# Patient Record
Sex: FEMALE | Race: WHITE | NOT HISPANIC OR LATINO | Employment: UNEMPLOYED | URBAN - METROPOLITAN AREA
[De-identification: names, ages, dates, MRNs, and addresses within clinical notes are randomized per-mention and may not be internally consistent; named-entity substitution may affect disease eponyms.]

---

## 2017-02-08 ENCOUNTER — CONVERSION ENCOUNTER (OUTPATIENT)
Dept: GENERAL RADIOLOGY | Facility: HOSPITAL | Age: 47
End: 2017-02-08

## 2018-02-26 ENCOUNTER — OFFICE VISIT CONVERTED (OUTPATIENT)
Dept: GASTROENTEROLOGY | Facility: CLINIC | Age: 48
End: 2018-02-26
Attending: INTERNAL MEDICINE

## 2018-03-13 ENCOUNTER — OFFICE VISIT CONVERTED (OUTPATIENT)
Dept: CARDIOLOGY | Facility: CLINIC | Age: 48
End: 2018-03-13
Attending: SPECIALIST

## 2018-03-13 ENCOUNTER — CONVERSION ENCOUNTER (OUTPATIENT)
Dept: CARDIOLOGY | Facility: CLINIC | Age: 48
End: 2018-03-13

## 2020-10-21 ENCOUNTER — OUTSIDE FACILITY SERVICE (OUTPATIENT)
Dept: SLEEP MEDICINE | Facility: HOSPITAL | Age: 50
End: 2020-10-21

## 2020-10-21 ENCOUNTER — HOSPITAL ENCOUNTER (OUTPATIENT)
Dept: SLEEP MEDICINE | Facility: HOSPITAL | Age: 50
Discharge: HOME | End: 2020-10-21
Attending: INTERNAL MEDICINE

## 2020-10-21 PROCEDURE — 99244 OFF/OP CNSLTJ NEW/EST MOD 40: CPT | Performed by: INTERNAL MEDICINE

## 2020-11-02 ENCOUNTER — HOSPITAL ENCOUNTER (OUTPATIENT)
Dept: PREADMISSION TESTING | Facility: HOSPITAL | Age: 50
Discharge: HOME | End: 2020-11-02
Attending: INTERNAL MEDICINE

## 2020-11-04 LAB — SARS-COV-2 RNA SPEC QL NAA+PROBE: NOT DETECTED

## 2020-11-06 ENCOUNTER — HOSPITAL ENCOUNTER (OUTPATIENT)
Dept: SLEEP MEDICINE | Facility: HOSPITAL | Age: 50
Discharge: HOME | End: 2020-11-06
Attending: INTERNAL MEDICINE

## 2020-11-11 ENCOUNTER — OUTSIDE FACILITY SERVICE (OUTPATIENT)
Dept: SLEEP MEDICINE | Facility: HOSPITAL | Age: 50
End: 2020-11-11

## 2020-11-11 PROCEDURE — 95811 POLYSOM 6/>YRS CPAP 4/> PARM: CPT | Performed by: INTERNAL MEDICINE

## 2020-12-02 ENCOUNTER — HOSPITAL ENCOUNTER (OUTPATIENT)
Dept: SLEEP MEDICINE | Facility: HOSPITAL | Age: 50
Discharge: HOME | End: 2020-12-02
Attending: INTERNAL MEDICINE

## 2020-12-07 ENCOUNTER — OUTSIDE FACILITY SERVICE (OUTPATIENT)
Dept: SLEEP MEDICINE | Facility: HOSPITAL | Age: 50
End: 2020-12-07

## 2020-12-07 PROCEDURE — 95810 POLYSOM 6/> YRS 4/> PARAM: CPT | Performed by: INTERNAL MEDICINE

## 2021-02-03 ENCOUNTER — HOSPITAL ENCOUNTER (OUTPATIENT)
Dept: SLEEP MEDICINE | Facility: HOSPITAL | Age: 51
Discharge: HOME | End: 2021-02-03
Attending: INTERNAL MEDICINE

## 2021-02-03 ENCOUNTER — OUTSIDE FACILITY SERVICE (OUTPATIENT)
Dept: SLEEP MEDICINE | Facility: HOSPITAL | Age: 51
End: 2021-02-03

## 2021-02-03 PROCEDURE — 99213 OFFICE O/P EST LOW 20 MIN: CPT | Performed by: INTERNAL MEDICINE

## 2021-05-16 VITALS
SYSTOLIC BLOOD PRESSURE: 162 MMHG | HEART RATE: 86 BPM | HEIGHT: 69 IN | WEIGHT: 293 LBS | DIASTOLIC BLOOD PRESSURE: 98 MMHG | BODY MASS INDEX: 43.4 KG/M2

## 2021-05-16 VITALS
DIASTOLIC BLOOD PRESSURE: 85 MMHG | OXYGEN SATURATION: 97 % | BODY MASS INDEX: 43.4 KG/M2 | HEART RATE: 81 BPM | WEIGHT: 293 LBS | SYSTOLIC BLOOD PRESSURE: 145 MMHG | RESPIRATION RATE: 12 BRPM | HEIGHT: 69 IN

## 2021-07-09 ENCOUNTER — TRANSCRIBE ORDERS (OUTPATIENT)
Dept: ADMINISTRATIVE | Facility: HOSPITAL | Age: 51
End: 2021-07-09

## 2021-07-09 DIAGNOSIS — Z12.31 SCREENING MAMMOGRAM FOR HIGH-RISK PATIENT: Primary | ICD-10-CM

## 2021-07-14 ENCOUNTER — OFFICE VISIT (OUTPATIENT)
Dept: OTOLARYNGOLOGY | Facility: CLINIC | Age: 51
End: 2021-07-14

## 2021-07-14 VITALS — TEMPERATURE: 96.9 F | HEIGHT: 69 IN | BODY MASS INDEX: 43.4 KG/M2 | WEIGHT: 293 LBS

## 2021-07-14 DIAGNOSIS — J32.9 RECURRENT SINUS INFECTIONS: ICD-10-CM

## 2021-07-14 DIAGNOSIS — H91.90 HEARING LOSS, UNSPECIFIED HEARING LOSS TYPE, UNSPECIFIED LATERALITY: ICD-10-CM

## 2021-07-14 DIAGNOSIS — H92.01 OTALGIA OF RIGHT EAR: ICD-10-CM

## 2021-07-14 DIAGNOSIS — J30.9 ALLERGIC RHINITIS, UNSPECIFIED SEASONALITY, UNSPECIFIED TRIGGER: Primary | ICD-10-CM

## 2021-07-14 PROCEDURE — 99213 OFFICE O/P EST LOW 20 MIN: CPT | Performed by: NURSE PRACTITIONER

## 2021-07-14 RX ORDER — BLOOD-GLUCOSE METER
1 KIT MISCELLANEOUS
COMMUNITY
Start: 2020-07-28 | End: 2021-07-28

## 2021-07-14 RX ORDER — LISINOPRIL 2.5 MG/1
2.5 TABLET ORAL DAILY
COMMUNITY
Start: 2021-06-04 | End: 2022-02-09

## 2021-07-14 RX ORDER — METOPROLOL TARTRATE 100 MG/1
100 TABLET ORAL 3 TIMES DAILY
COMMUNITY
Start: 2021-04-22

## 2021-07-14 RX ORDER — INSULIN HUMAN 500 [IU]/ML
INJECTION, SOLUTION SUBCUTANEOUS
COMMUNITY
Start: 2021-05-11

## 2021-07-14 RX ORDER — AMIODARONE HYDROCHLORIDE 200 MG/1
200 TABLET ORAL 2 TIMES DAILY
COMMUNITY
Start: 2021-04-21 | End: 2022-02-09

## 2021-07-14 RX ORDER — FLURBIPROFEN SODIUM 0.3 MG/ML
SOLUTION/ DROPS OPHTHALMIC
COMMUNITY
Start: 2021-06-16

## 2021-07-14 RX ORDER — LIRAGLUTIDE 6 MG/ML
INJECTION SUBCUTANEOUS
COMMUNITY
Start: 2021-06-15 | End: 2021-08-24

## 2021-07-14 RX ORDER — INSULIN DEGLUDEC INJECTION 100 U/ML
INJECTION, SOLUTION SUBCUTANEOUS
COMMUNITY
End: 2021-08-24

## 2021-07-14 RX ORDER — SPIRONOLACTONE 25 MG/1
TABLET ORAL
COMMUNITY
Start: 2021-07-13 | End: 2022-02-09

## 2021-07-14 RX ORDER — DAPAGLIFLOZIN 10 MG/1
1 TABLET, FILM COATED ORAL DAILY
COMMUNITY
Start: 2021-06-04 | End: 2022-02-09

## 2021-07-14 RX ORDER — FLUTICASONE PROPIONATE 50 MCG
2 SPRAY, SUSPENSION (ML) NASAL DAILY
Qty: 16 G | Refills: 6 | Status: SHIPPED | OUTPATIENT
Start: 2021-07-14 | End: 2022-02-09

## 2021-07-14 RX ORDER — DIGOXIN 250 MCG
250 TABLET ORAL DAILY
COMMUNITY
Start: 2021-06-28 | End: 2022-02-09

## 2021-07-14 RX ORDER — CEFDINIR 300 MG/1
CAPSULE ORAL
COMMUNITY
Start: 2021-06-15 | End: 2021-08-24

## 2021-07-14 RX ORDER — CALCIUM CITRATE/VITAMIN D3 200MG-6.25
TABLET ORAL
COMMUNITY
Start: 2021-02-08

## 2021-07-14 RX ORDER — APIXABAN 5 MG/1
5 TABLET, FILM COATED ORAL 2 TIMES DAILY
COMMUNITY
Start: 2021-04-21

## 2021-07-14 NOTE — PATIENT INSTRUCTIONS
Neilmed Saline Nasal Rinse  http://www.[employer].Wi3/usa/directions-for-use.php        Step 1  Please wash your hands. Fill the clean bottle with the designated volume of either distilled, micro-filtered (through 0.2 micron filter), commercially bottled or previously boiled and cooled down water. Always rinse your nasal passages with NeilMed® Sinus Rinse™ packets only. Our packets contain a mixture of USP grade sodium chloride and sodium bicarbonate. These ingredients are of the purest quality available to make the dry powder mixture. Rinsing your nasal passages with only plain water without our mixture will result in a severe burning sensation as the plain water is not physiologic for your nasal lining, even if it is appropriate for drinking. Additionally, for your safety, do not use tap or faucet water for dissolving the mixture unless it has been previously boiled for five minutes or more as boiling sterilizes the water. Other choices are distilled, micro-filtered (through 0.2 micron), commercially bottled or, as mentioned earlier, previously boiled water at lukewarm or body temperature. You can store boiled water in a clean container for seven days or more if refrigerated. Do not use non-chlorinated or non-ultra (0.2 micron) filtered well water unless it is boiled and then cooled to lukewarm or body temperature.  *You may warm the water in a microwave in increments of 5 to 10 seconds to avoid overheating the water, damaging the device or scalding your nasal passage.   Step 2  Cut the Sinus Rinse™ mixture packet at the corner and pour its contents into the bottle. Tighten the cap and tube on the bottle securely. Place one finger over the tip of the cap and shake the bottle gently to dissolve the mixture.   Step 3  Standing in front of a sink, bend forward to your comfort level and tilt your head down. Keeping your mouth open, without holding your breath, place the cap snugly against your nasal passage. SQUEEZE  BOTTLE GENTLY until the solution starts draining from the OPPOSITE nasal passage. Some may drain from your mouth. For a proper rinse, keep squeezing the bottle GENTLY until at least 1/4 to 1/2 (60 mL to 120 mL or 2 to 4 fl oz) of the bottle is used. Do not swallow the solution.   Step 4  Blow your nose very gently, without pinching nose completely to avoid pressure on eardrums. If tolerable, sniff in gently any residual solution remaining in the nasal passage once or twice, because this may clean out the posterior nasopharyngeal area, which is the area at the back of your nasal passage. At times, some solution will reach the back of your throat, so please spit it out. To help drain any residual solution, blow your nose gently while tilting your head forward and to the opposite side of the nasal passage you just rinsed.  Step 5  Now repeat steps 3 and 4 for your other nasal passage. Most users fi nd that rinsing twice a day is beneficial, similar to brushing your teeth. Many doctors recommend rinsing 3-4 times daily or for special circumstances, even rinsing up to 6 times a day is safe. Please follow your physician’s advice.

## 2021-07-14 NOTE — PROGRESS NOTES
Patient Name: Giancarlo Contreras   Visit Date: 07/14/2021   Patient ID: [id number]  Provider: RA Mcgowan    Sex: female  Location: [facility]   YOB: 1970  Location Address: [address] [place] [address], [address] [place] [address],?[place]?[id number]    Primary Care Provider Raisa Francisco APRN  Location Phone: [phone]    Referring Provider: [facility]        Chief Complaint  Earache    Subjective   Giancarlo Contreras is a 51 y.o. female who presents to [facility] today as a consult from [facility] Whipple for evaluation of right otalgia, hearing loss and recurrent sinusitis.  Patient reports that for many years she has had issues with almost constant right ear pain.  She states that it hurts to deep in the ear and she often feels a popping sound in that ear.  She is feels like her hearing is decreased in the right ear.  She states that she has a difficult time hearing her children and often has to turn her left ear towards people to hear them.  She will occasionally have bilateral ear ringing.  She states that her dad has had issues with hearing.  She denies recurrent otitis media, ear surgeries or significant noise exposure history.  She reports that she has diagnosed with sinus infections every couple of months and this has been ongoing for many years.  She reports frequent pressure in her sinuses and rhinitis.  She was most recently treated with an antibiotic and just finished this.  She does not do any nasal or sinus rinses.  She does use Flonase once daily.      Current Outpatient Medications on File Prior to Visit   Medication Sig   • amiodarone (PACERONE) 200 MG tablet Take 200 mg by mouth 2 (Two) Times a Day.   • B-D UF III MINI PEN NEEDLES 31G X 5 MM misc USE ONE PEN NEEDLE 4X DAILY.   • Blood Glucose Monitoring Suppl (Blood Glucose System [place]) kit 1 each.   • cefdinir (OMNICEF) 300 MG capsule TAKE 1 CAPSULE BY MOUTH TWICE A DAY FOR  "10 DAYS   • digoxin (LANOXIN) 250 MCG tablet Take 250 mcg by mouth Daily.   • Eliquis 5 MG tablet tablet Take 5 mg by mouth 2 (Two) Times a Day.   • Farxiga 10 MG tablet Take 1 tablet by mouth Daily.   • glucose blood (True Metrix Blood Glucose Test) test strip Check 3 times daily. Dispense brand covered by insurance.   • HumuLIN R U-500 KwikPen 500 UNIT/ML solution pen-injector CONCENTRATED injection INJECT 60 UNITS SUBCUTANEOUSLY 3 TIMES A DAY BEFORE MEALS   • insulin degludec (Tresiba FlexTouch) 100 UNIT/ML solution pen-injector injection Tresiba FlexTouch U-100 100 unit/mL (3 mL) subcutaneous insulin pen inject by subcutaneous route as per insulin protocol   Active   • Lancets Misc. misc Inject 1 each under the skin into the appropriate area as directed Daily.   • lisinopril (PRINIVIL,ZESTRIL) 2.5 MG tablet Take 2.5 mg by mouth Daily.   • metFORMIN (GLUCOPHAGE) 500 MG tablet Take 1,000 mg by mouth 2 (Two) Times a Day With Meals.   • metoprolol tartrate (LOPRESSOR) 100 MG tablet Take 100 mg by mouth 3 (Three) Times a Day.   • milk thistle 175 MG tablet Take 175 mg by mouth Daily.   • spironolactone (ALDACTONE) 25 MG tablet    • Victoza 18 MG/3ML solution pen-injector injection INJECT 1.2MG SUBCUTANEOUSLY DAILY FOR 7 DAYS, THEN INCREASE TO 1.8MG THEREAFTER     No current facility-administered medications on file prior to visit.        Social History     Tobacco Use   • Smoking status: Never Smoker   • Smokeless tobacco: Never Used   • Tobacco comment: Denies    Vaping Use   • Vaping Use: Never used   Substance Use Topics   • Alcohol use: Never   • Drug use: Not on file       Objective     Vital Signs:   Temp 96.9 °F (36.1 °C) (Temporal)   Ht 175.3 cm (69\")   Wt (!) 163 kg (360 lb 3.2 oz)   BMI 53.19 kg/m²       Physical Exam  Constitutional:       General: She is not in acute distress.     Appearance: Normal appearance. She is not ill-appearing.   HENT:      Head: Normocephalic and atraumatic.      Jaw: There " is normal jaw occlusion. No tenderness or pain on movement.      Salivary Glands: Right salivary gland is not diffusely enlarged or tender. Left salivary gland is not diffusely enlarged or tender.      Right Ear: Tympanic membrane, ear canal and external ear normal.      Left Ear: Tympanic membrane, ear canal and external ear normal.      Ears:      Cowart exam findings: lateralizes left.     Right Rinne: AC > BC.     Left Rinne: AC > BC.     Nose: Nose normal. No septal deviation.      Right Sinus: No maxillary sinus tenderness or frontal sinus tenderness.      Left Sinus: No maxillary sinus tenderness or frontal sinus tenderness.      Mouth/Throat:      Lips: Pink. No lesions.      Mouth: Mucous membranes are moist. No oral lesions.      Dentition: Normal dentition.      Tongue: No lesions.      Palate: No mass and lesions.      Pharynx: Oropharynx is clear. Uvula midline.      Tonsils: No tonsillar exudate.   Eyes:      Extraocular Movements: Extraocular movements intact.      Conjunctiva/sclera: Conjunctivae normal.      Pupils: Pupils are equal, round, and reactive to light.   Neck:      Thyroid: No thyroid mass, thyromegaly or thyroid tenderness.      Trachea: Trachea normal.   Pulmonary:      Effort: Pulmonary effort is normal. No respiratory distress.   Musculoskeletal:         General: Normal range of motion.      Cervical back: Normal range of motion and neck supple. No tenderness.   Lymphadenopathy:      Cervical: No cervical adenopathy.   Skin:     General: Skin is warm and dry.   Neurological:      General: No focal deficit present.      Mental Status: She is alert and oriented to person, place, and time.      Cranial Nerves: No cranial nerve deficit.      Motor: No weakness.      Gait: Gait normal.   Psychiatric:         Mood and Affect: Mood normal.         Behavior: Behavior normal.         Thought Content: Thought content normal.         Judgment: Judgment normal.               Result Review :               Assessment and Plan    Diagnoses and all orders for this visit:    1. Allergic rhinitis, unspecified seasonality, unspecified trigger (Primary)  -     fluticasone (Flonase) 50 MCG/ACT nasal spray; 2 sprays into the nostril(s) as directed by provider Daily. Administer 2 sprays in each nostril for each dose.  Dispense: 16 g; Refill: 6    2. Recurrent sinus infections    3. Otalgia of right ear    4. Hearing loss, unspecified hearing loss type, unspecified laterality    On examination today her ears are normal in appearance.  Tuning fork testing did show lateralization to the left with air conduction greater than bone conduction.  I would like to see her back to get an audiogram and tympanogram testing.  I am going to have her start doing daily sinus rinses and use her Flonase after the rinse and also use politzerization techniques to help open up her eustachian tubes.  I do not see any abnormalities on physical exam of her ears today.  I will plan to see her back after her audiogram for follow-up.       Follow Up   Return in about 4 weeks (around 8/11/2021), or with audio.  Patient was given instructions and counseling regarding her condition or for health maintenance advice. Please see specific information pulled into the AVS if appropriate.      RA Mcgowan

## 2021-08-24 ENCOUNTER — PROCEDURE VISIT (OUTPATIENT)
Dept: OTOLARYNGOLOGY | Facility: CLINIC | Age: 51
End: 2021-08-24

## 2021-08-24 ENCOUNTER — OFFICE VISIT (OUTPATIENT)
Dept: OTOLARYNGOLOGY | Facility: CLINIC | Age: 51
End: 2021-08-24

## 2021-08-24 VITALS — BODY MASS INDEX: 43.4 KG/M2 | HEIGHT: 69 IN | WEIGHT: 293 LBS | TEMPERATURE: 97.4 F

## 2021-08-24 DIAGNOSIS — H91.8X9 ASYMMETRICAL HEARING LOSS: Primary | ICD-10-CM

## 2021-08-24 DIAGNOSIS — H90.11 CONDUCTIVE HEARING LOSS OF RIGHT EAR, UNSPECIFIED HEARING STATUS ON CONTRALATERAL SIDE: ICD-10-CM

## 2021-08-24 DIAGNOSIS — J30.9 ALLERGIC RHINITIS, UNSPECIFIED SEASONALITY, UNSPECIFIED TRIGGER: ICD-10-CM

## 2021-08-24 DIAGNOSIS — H69.81 DYSFUNCTION OF RIGHT EUSTACHIAN TUBE: ICD-10-CM

## 2021-08-24 DIAGNOSIS — H90.41 SENSORINEURAL HEARING LOSS (SNHL) OF RIGHT EAR WITH UNRESTRICTED HEARING OF LEFT EAR: ICD-10-CM

## 2021-08-24 DIAGNOSIS — J34.2 DEVIATED NASAL SEPTUM: ICD-10-CM

## 2021-08-24 DIAGNOSIS — H93.11 RIGHT-SIDED TINNITUS: ICD-10-CM

## 2021-08-24 PROCEDURE — 92557 COMPREHENSIVE HEARING TEST: CPT | Performed by: AUDIOLOGIST

## 2021-08-24 PROCEDURE — 92567 TYMPANOMETRY: CPT | Performed by: AUDIOLOGIST

## 2021-08-24 PROCEDURE — 99214 OFFICE O/P EST MOD 30 MIN: CPT | Performed by: NURSE PRACTITIONER

## 2021-08-24 RX ORDER — FUROSEMIDE 80 MG
80 TABLET ORAL 2 TIMES DAILY
COMMUNITY
End: 2022-02-09

## 2021-08-24 NOTE — PROGRESS NOTES
Patient Name: Giancarlo Contreras   Visit Date: 08/24/2021   Patient ID: [id number]  Provider: RA Mcgowan    Sex: female  Location: [facility]   YOB: 1970  Location Address: [address] [place] [address], [address],?[place]?[address]    Primary Care Provider Raisa Francisco APRN  Location Phone: [phone]    Referring Provider: No ref. provider found        Chief Complaint  Follow-up (audio results)    Subjective          Giancarlo Contreras is a 51 y.o. female who presents to [facility] for a follow-up visit of right-sided hearing loss, right ear pain and recurrent sinusitis.  She states that she has had a lot of clear rhinitis and right ear pain lately.  She states she continues to have a hard time hearing out of her right ear.  She has been using her Flonase daily.  She states that she did the sinus rinse 1 time but did not like how she felt like it stayed in the right side of her nose.  She states she feels like she has a hard time breathing out of the right side of her nose at times.      Current Outpatient Medications on File Prior to Visit   Medication Sig   • amiodarone (PACERONE) 200 MG tablet Take 200 mg by mouth 2 (Two) Times a Day.   • B-D UF III MINI PEN NEEDLES 31G X 5 MM misc USE ONE PEN NEEDLE 4X DAILY.   • digoxin (LANOXIN) 250 MCG tablet Take 250 mcg by mouth Daily.   • Eliquis 5 MG tablet tablet Take 5 mg by mouth 2 (Two) Times a Day.   • Farxiga 10 MG tablet Take 1 tablet by mouth Daily.   • fluticasone (Flonase) 50 MCG/ACT nasal spray 2 sprays into the nostril(s) as directed by provider Daily. Administer 2 sprays in each nostril for each dose.   • furosemide (LASIX) 80 MG tablet Take 80 mg by mouth 2 (Two) Times a Day.   • glucose blood (True Metrix Blood Glucose Test) test strip Check 3 times daily. Dispense brand covered by insurance.   • HumuLIN R U-500 KwikPen 500 UNIT/ML solution pen-injector CONCENTRATED injection INJECT  "60 UNITS SUBCUTANEOUSLY 3 TIMES A DAY BEFORE MEALS   • lisinopril (PRINIVIL,ZESTRIL) 2.5 MG tablet Take 2.5 mg by mouth Daily.   • metFORMIN (GLUCOPHAGE) 500 MG tablet Take 1,000 mg by mouth 2 (Two) Times a Day With Meals.   • metoprolol tartrate (LOPRESSOR) 100 MG tablet Take 100 mg by mouth 3 (Three) Times a Day.   • milk thistle 175 MG tablet Take 175 mg by mouth Daily.   • spironolactone (ALDACTONE) 25 MG tablet    • [DISCONTINUED] cefdinir (OMNICEF) 300 MG capsule TAKE 1 CAPSULE BY MOUTH TWICE A DAY FOR 10 DAYS   • [DISCONTINUED] insulin degludec (Tresiba FlexTouch) 100 UNIT/ML solution pen-injector injection Tresiba FlexTouch U-100 100 unit/mL (3 mL) subcutaneous insulin pen inject by subcutaneous route as per insulin protocol   Active   • [DISCONTINUED] Victoza 18 MG/3ML solution pen-injector injection INJECT 1.2MG SUBCUTANEOUSLY DAILY FOR 7 DAYS, THEN INCREASE TO 1.8MG THEREAFTER     No current facility-administered medications on file prior to visit.        Social History     Tobacco Use   • Smoking status: Never Smoker   • Smokeless tobacco: Never Used   • Tobacco comment: Denies    Vaping Use   • Vaping Use: Never used   Substance Use Topics   • Alcohol use: Never   • Drug use: Not on file        Objective     Vital Signs:   Temp 97.4 °F (36.3 °C) (Temporal)   Ht 175.3 cm (69\")   Wt (!) 167 kg (369 lb 3.2 oz)   BMI 54.52 kg/m²       Physical Exam  Constitutional:       General: She is not in acute distress.     Appearance: Normal appearance. She is not ill-appearing.   HENT:      Head: Normocephalic and atraumatic.      Jaw: There is normal jaw occlusion. No tenderness or pain on movement.      Salivary Glands: Right salivary gland is not diffusely enlarged or tender. Left salivary gland is not diffusely enlarged or tender.      Right Ear: Tympanic membrane, ear canal and external ear normal.      Left Ear: Tympanic membrane, ear canal and external ear normal.      Nose: Septal deviation present.      " Right Turbinates: Enlarged and swollen.      Right Sinus: No maxillary sinus tenderness or frontal sinus tenderness.      Left Sinus: No maxillary sinus tenderness or frontal sinus tenderness.      Comments: Septum deviated toward the right     Mouth/Throat:      Lips: Pink. No lesions.      Mouth: Mucous membranes are moist. No oral lesions.      Dentition: Normal dentition.      Tongue: No lesions.      Palate: No mass and lesions.      Pharynx: Oropharynx is clear. Uvula midline.      Tonsils: No tonsillar exudate.   Eyes:      Extraocular Movements: Extraocular movements intact.      Conjunctiva/sclera: Conjunctivae normal.      Pupils: Pupils are equal, round, and reactive to light.   Neck:      Thyroid: No thyroid mass, thyromegaly or thyroid tenderness.      Trachea: Trachea normal.   Pulmonary:      Effort: Pulmonary effort is normal. No respiratory distress.   Musculoskeletal:         General: Normal range of motion.      Cervical back: Normal range of motion and neck supple. No tenderness.   Lymphadenopathy:      Cervical: No cervical adenopathy.   Skin:     General: Skin is warm and dry.   Neurological:      General: No focal deficit present.      Mental Status: She is alert and oriented to person, place, and time.      Cranial Nerves: No cranial nerve deficit.      Motor: No weakness.      Gait: Gait normal.   Psychiatric:         Mood and Affect: Mood normal.         Behavior: Behavior normal.         Thought Content: Thought content normal.         Judgment: Judgment normal.                Result Review :               Assessment and Plan    Diagnoses and all orders for this visit:    1. Asymmetrical hearing loss (Primary)  -     MRI Internal Auditory Canal With Contrast; Future  -     Audiometry With Tympanometry; Future    2. Sensorineural hearing loss (SNHL) of right ear with unrestricted hearing of left ear  -     MRI Internal Auditory Canal With Contrast; Future  -     Audiometry With  Tympanometry; Future    3. Allergic rhinitis, unspecified seasonality, unspecified trigger    4. Deviated nasal septum    Audiogram and tympanogram testing was performed in clinic today.  Audiogram shows the left ear with normal hearing.  The right ear has a mild to severe sensorineural hearing loss from 3000 to 8000 Hz.  Speech reception thresholds at 10 dB bilaterally.  Word discrimination scores 100% bilaterally at 60 dB.  Tympanograms are normal bilaterally.  Based on her asymmetrical hearing loss would like to get an MRI of the internal auditory canals to rule out an acoustic neuroma.  I encouraged her to continue to try to do the sinus rinses and use her antihistamines daily.  If no improvement in her symptoms and a normal MRI we will consider sending her to an allergist for possible allergy testing and immunotherapy.       Follow Up   Return in about 1 month (around 9/24/2021).  Patient was given instructions and counseling regarding her condition or for health maintenance advice. Please see specific information pulled into the AVS if appropriate.     RA Mcgowan

## 2021-09-10 ENCOUNTER — LAB (OUTPATIENT)
Dept: LAB | Facility: HOSPITAL | Age: 51
End: 2021-09-10

## 2021-09-10 ENCOUNTER — TRANSCRIBE ORDERS (OUTPATIENT)
Dept: LAB | Facility: HOSPITAL | Age: 51
End: 2021-09-10

## 2021-09-10 DIAGNOSIS — Z00.00 ROUTINE GENERAL MEDICAL EXAMINATION AT A HEALTH CARE FACILITY: Primary | ICD-10-CM

## 2021-09-10 DIAGNOSIS — Z00.00 ROUTINE GENERAL MEDICAL EXAMINATION AT A HEALTH CARE FACILITY: ICD-10-CM

## 2021-09-10 PROCEDURE — C9803 HOPD COVID-19 SPEC COLLECT: HCPCS

## 2021-09-10 PROCEDURE — U0004 COV-19 TEST NON-CDC HGH THRU: HCPCS

## 2021-09-11 LAB — SARS-COV-2 RNA NOSE QL NAA+PROBE: DETECTED

## 2021-09-21 ENCOUNTER — APPOINTMENT (OUTPATIENT)
Dept: MRI IMAGING | Facility: HOSPITAL | Age: 51
End: 2021-09-21

## 2021-10-11 ENCOUNTER — HOSPITAL ENCOUNTER (OUTPATIENT)
Dept: MRI IMAGING | Facility: HOSPITAL | Age: 51
Discharge: HOME | End: 2021-10-11
Admitting: NURSE PRACTITIONER

## 2021-10-11 DIAGNOSIS — H91.8X9 ASYMMETRICAL HEARING LOSS: ICD-10-CM

## 2021-10-11 DIAGNOSIS — H90.41 SENSORINEURAL HEARING LOSS (SNHL) OF RIGHT EAR WITH UNRESTRICTED HEARING OF LEFT EAR: ICD-10-CM

## 2021-10-11 LAB — CREAT BLDA-MCNC: 1.1 MG/DL

## 2021-10-11 PROCEDURE — 0 GADOBENATE DIMEGLUMINE 529 MG/ML SOLUTION: Performed by: NURSE PRACTITIONER

## 2021-10-11 PROCEDURE — 70553 MRI BRAIN STEM W/O & W/DYE: CPT

## 2021-10-11 PROCEDURE — A9577 INJ MULTIHANCE: HCPCS | Performed by: NURSE PRACTITIONER

## 2021-10-11 PROCEDURE — 82565 ASSAY OF CREATININE: CPT

## 2021-10-11 RX ADMIN — GADOBENATE DIMEGLUMINE 20 ML: 529 INJECTION, SOLUTION INTRAVENOUS at 11:04

## 2021-10-18 ENCOUNTER — OFFICE VISIT (OUTPATIENT)
Dept: OTOLARYNGOLOGY | Facility: CLINIC | Age: 51
End: 2021-10-18

## 2021-10-18 VITALS — HEIGHT: 69 IN | TEMPERATURE: 97.4 F | BODY MASS INDEX: 43.4 KG/M2 | WEIGHT: 293 LBS

## 2021-10-18 DIAGNOSIS — J34.2 DEVIATED NASAL SEPTUM: ICD-10-CM

## 2021-10-18 DIAGNOSIS — H90.41 SENSORINEURAL HEARING LOSS (SNHL) OF RIGHT EAR WITH UNRESTRICTED HEARING OF LEFT EAR: ICD-10-CM

## 2021-10-18 DIAGNOSIS — J30.9 ALLERGIC RHINITIS, UNSPECIFIED SEASONALITY, UNSPECIFIED TRIGGER: ICD-10-CM

## 2021-10-18 DIAGNOSIS — H91.8X9 ASYMMETRICAL HEARING LOSS: Primary | ICD-10-CM

## 2021-10-18 PROCEDURE — 99213 OFFICE O/P EST LOW 20 MIN: CPT | Performed by: NURSE PRACTITIONER

## 2021-10-18 RX ORDER — INSULIN DEGLUDEC 200 U/ML
INJECTION, SOLUTION SUBCUTANEOUS
COMMUNITY
Start: 2021-09-20 | End: 2022-02-09

## 2021-10-18 NOTE — PROGRESS NOTES
Patient Name: Giancarlo Contreras   Visit Date: 10/18/2021   Patient ID: [id number]  Provider: RA Mcgowan    Sex: female  Location: [facility]   YOB: 1970  Location Address: [address] [place] [address], [address],?[place]?[phone]    Primary Care Provider Raisa Francisco APRN  Location Phone: [phone]    Referring Provider: No ref. provider found        Chief Complaint  Follow-up (going over MRI results)    Subjective          Giancarlo Contreras is a 51 y.o. female who presents to [facility] for a follow-up visit of right-sided asymmetrical sensorineural hearing loss, deviated nasal septum, allergic rhinitis and right ear pressure.  Patient states she continues to have issues with clear rhinitis and difficulty breathing out of the right side of her nose.  She states that at night she feels pressure in her ear.  She states she has to use cotton in her ear because any sort of cold air will cause pain on the right side.  We did recently order an MRI of the IACs for her asymmetrical hearing loss.This was performed on 10/11/2021 and shows normal appearances of the middle and inner ear structures bilaterally.  Also on review of her images it does appear that her sinuses are well aerated with no signs of chronic or acute sinusitis.  She does have deviated nasal septum towards the right side causing some nasal obstruction.      Current Outpatient Medications on File Prior to Visit   Medication Sig   • amiodarone (PACERONE) 200 MG tablet Take 200 mg by mouth 2 (Two) Times a Day.   • B-D UF III MINI PEN NEEDLES 31G X 5 MM misc USE ONE PEN NEEDLE 4X DAILY.   • digoxin (LANOXIN) 250 MCG tablet Take 250 mcg by mouth Daily.   • Eliquis 5 MG tablet tablet Take 5 mg by mouth 2 (Two) Times a Day.   • Farxiga 10 MG tablet Take 1 tablet by mouth Daily.   • fluticasone (Flonase) 50 MCG/ACT nasal spray 2 sprays into the nostril(s) as directed by provider Daily.  "Administer 2 sprays in each nostril for each dose.   • furosemide (LASIX) 80 MG tablet Take 80 mg by mouth 2 (Two) Times a Day.   • glucose blood (True Metrix Blood Glucose Test) test strip Check 3 times daily. Dispense brand covered by insurance.   • HumuLIN R U-500 KwikPen 500 UNIT/ML solution pen-injector CONCENTRATED injection INJECT 60 UNITS SUBCUTANEOUSLY 3 TIMES A DAY BEFORE MEALS   • lisinopril (PRINIVIL,ZESTRIL) 2.5 MG tablet Take 2.5 mg by mouth Daily.   • metFORMIN (GLUCOPHAGE) 500 MG tablet Take 1,000 mg by mouth 2 (Two) Times a Day With Meals.   • metoprolol tartrate (LOPRESSOR) 100 MG tablet Take 100 mg by mouth 3 (Three) Times a Day.   • milk thistle 175 MG tablet Take 175 mg by mouth Daily.   • spironolactone (ALDACTONE) 25 MG tablet    • Tresiba FlexTouch 200 UNIT/ML solution pen-injector pen injection      No current facility-administered medications on file prior to visit.        Social History     Tobacco Use   • Smoking status: Never Smoker   • Smokeless tobacco: Never Used   • Tobacco comment: Denies    Vaping Use   • Vaping Use: Never used   Substance Use Topics   • Alcohol use: Never   • Drug use: Never        Objective     Vital Signs:   Temp 97.4 °F (36.3 °C) (Temporal)   Ht 175.3 cm (69.02\")   Wt (!) 163 kg (359 lb)   BMI 52.99 kg/m²       Physical Exam  Constitutional:       General: She is not in acute distress.     Appearance: Normal appearance. She is not ill-appearing.   HENT:      Head: Normocephalic and atraumatic.      Jaw: There is normal jaw occlusion. No tenderness or pain on movement.      Salivary Glands: Right salivary gland is not diffusely enlarged or tender. Left salivary gland is not diffusely enlarged or tender.      Right Ear: Tympanic membrane, ear canal and external ear normal.      Left Ear: Tympanic membrane, ear canal and external ear normal.      Nose: Septal deviation present.      Right Turbinates: Enlarged and swollen.      Left Turbinates: Enlarged and " swollen.      Right Sinus: No maxillary sinus tenderness or frontal sinus tenderness.      Left Sinus: No maxillary sinus tenderness or frontal sinus tenderness.      Mouth/Throat:      Lips: Pink. No lesions.      Mouth: Mucous membranes are moist. No oral lesions.      Dentition: Normal dentition.      Tongue: No lesions.      Palate: No mass and lesions.      Pharynx: Oropharynx is clear. Uvula midline.      Tonsils: No tonsillar exudate.   Eyes:      Extraocular Movements: Extraocular movements intact.      Conjunctiva/sclera: Conjunctivae normal.      Pupils: Pupils are equal, round, and reactive to light.   Neck:      Thyroid: No thyroid mass, thyromegaly or thyroid tenderness.      Trachea: Trachea normal.   Pulmonary:      Effort: Pulmonary effort is normal. No respiratory distress.   Musculoskeletal:         General: Normal range of motion.      Cervical back: Normal range of motion and neck supple. No tenderness.   Lymphadenopathy:      Cervical: No cervical adenopathy.   Skin:     General: Skin is warm and dry.   Neurological:      General: No focal deficit present.      Mental Status: She is alert and oriented to person, place, and time.      Cranial Nerves: No cranial nerve deficit.      Motor: No weakness.      Gait: Gait normal.   Psychiatric:         Mood and Affect: Mood normal.         Behavior: Behavior normal.         Thought Content: Thought content normal.         Judgment: Judgment normal.                Result Review :          MRI Internal Auditory Canal With Wo (10/11/2021 11:19)       Assessment and Plan    Diagnoses and all orders for this visit:    1. Asymmetrical hearing loss (Primary)    2. Sensorineural hearing loss (SNHL) of right ear with unrestricted hearing of left ear    3. Deviated nasal septum  -     Ambulatory Referral to Allergy    4. Allergic rhinitis, unspecified seasonality, unspecified trigger  -     Ambulatory Referral to Allergy    I was able to review the MRI of her  internal auditory canals with her today.  We have discussed her deviated nasal septum and allergic rhinitis symptoms.  Surgical options were discussed with the patient.  At this time she would like to hold off on any repair of the deviated nasal septum due to her cardiac history.  We discussed sending her to an allergist for possible allergy testing and immunotherapy and she is agreeable to this.  I will get this scheduled and have her call us for follow-up should she want to consider surgical options.       Follow Up   No follow-ups on file.  Patient was given instructions and counseling regarding her condition or for health maintenance advice. Please see specific information pulled into the AVS if appropriate.     Rosemary Vergara, RA

## 2021-11-17 ENCOUNTER — HOSPITAL ENCOUNTER (OUTPATIENT)
Dept: MAMMOGRAPHY | Facility: HOSPITAL | Age: 51
Discharge: HOME | End: 2021-11-17
Admitting: NURSE PRACTITIONER

## 2021-11-17 DIAGNOSIS — Z12.31 SCREENING MAMMOGRAM FOR HIGH-RISK PATIENT: ICD-10-CM

## 2021-11-17 PROCEDURE — 77063 BREAST TOMOSYNTHESIS BI: CPT

## 2021-11-17 PROCEDURE — 77067 SCR MAMMO BI INCL CAD: CPT

## 2025-05-04 ENCOUNTER — APPOINTMENT (OUTPATIENT)
Dept: GENERAL RADIOLOGY | Facility: HOSPITAL | Age: 55
End: 2025-05-04
Payer: MEDICAID

## 2025-05-04 ENCOUNTER — HOSPITAL ENCOUNTER (EMERGENCY)
Facility: HOSPITAL | Age: 55
Discharge: HOME | End: 2025-05-04
Attending: EMERGENCY MEDICINE | Admitting: EMERGENCY MEDICINE
Payer: MEDICAID

## 2025-05-04 VITALS
SYSTOLIC BLOOD PRESSURE: 123 MMHG | RESPIRATION RATE: 20 BRPM | DIASTOLIC BLOOD PRESSURE: 60 MMHG | TEMPERATURE: 97.9 F | WEIGHT: 293 LBS | BODY MASS INDEX: 45.99 KG/M2 | HEIGHT: 67 IN | HEART RATE: 68 BPM | OXYGEN SATURATION: 97 %

## 2025-05-04 DIAGNOSIS — S90.02XA CONTUSION OF LEFT ANKLE, INITIAL ENCOUNTER: ICD-10-CM

## 2025-05-04 DIAGNOSIS — S80.12XA CONTUSION OF MULTIPLE SITES OF LEFT LOWER EXTREMITY, INITIAL ENCOUNTER: ICD-10-CM

## 2025-05-04 DIAGNOSIS — S80.12XA HEMATOMA OF LEFT LOWER LEG: ICD-10-CM

## 2025-05-04 DIAGNOSIS — S89.92XA INJURY OF LEFT LOWER EXTREMITY, INITIAL ENCOUNTER: Primary | ICD-10-CM

## 2025-05-04 LAB
ALBUMIN SERPL-MCNC: 4 G/DL (ref 3.5–5.2)
ALBUMIN/GLOB SERPL: 1.3 G/DL
ALP SERPL-CCNC: 64 U/L (ref 39–117)
ALT SERPL W P-5'-P-CCNC: 13 U/L (ref 1–33)
ANION GAP SERPL CALCULATED.3IONS-SCNC: 6.1 MMOL/L (ref 5–15)
AST SERPL-CCNC: 17 U/L (ref 1–32)
BASOPHILS # BLD AUTO: 0.06 10*3/MM3 (ref 0–0.2)
BASOPHILS NFR BLD AUTO: 0.6 % (ref 0–1.5)
BILIRUB SERPL-MCNC: 0.5 MG/DL (ref 0–1.2)
BUN SERPL-MCNC: 21 MG/DL (ref 6–20)
BUN/CREAT SERPL: 21.6 (ref 7–25)
CALCIUM SPEC-SCNC: 10 MG/DL (ref 8.6–10.5)
CHLORIDE SERPL-SCNC: 103 MMOL/L (ref 98–107)
CO2 SERPL-SCNC: 30.9 MMOL/L (ref 22–29)
CREAT SERPL-MCNC: 0.97 MG/DL (ref 0.57–1)
CRP SERPL-MCNC: 0.65 MG/DL (ref 0–0.5)
DEPRECATED RDW RBC AUTO: 44 FL (ref 37–54)
EGFRCR SERPLBLD CKD-EPI 2021: 68.3 ML/MIN/1.73
EOSINOPHIL # BLD AUTO: 0.29 10*3/MM3 (ref 0–0.4)
EOSINOPHIL NFR BLD AUTO: 2.9 % (ref 0.3–6.2)
ERYTHROCYTE [DISTWIDTH] IN BLOOD BY AUTOMATED COUNT: 13.4 % (ref 12.3–15.4)
ERYTHROCYTE [SEDIMENTATION RATE] IN BLOOD: 9 MM/HR (ref 0–30)
GLOBULIN UR ELPH-MCNC: 3.2 GM/DL
GLUCOSE SERPL-MCNC: 121 MG/DL (ref 65–99)
HCT VFR BLD AUTO: 39.1 % (ref 34–46.6)
HGB BLD-MCNC: 13.4 G/DL (ref 12–15.9)
IMM GRANULOCYTES # BLD AUTO: 0.05 10*3/MM3 (ref 0–0.05)
IMM GRANULOCYTES NFR BLD AUTO: 0.5 % (ref 0–0.5)
LYMPHOCYTES # BLD AUTO: 3.31 10*3/MM3 (ref 0.7–3.1)
LYMPHOCYTES NFR BLD AUTO: 33.5 % (ref 19.6–45.3)
MCH RBC QN AUTO: 30.9 PG (ref 26.6–33)
MCHC RBC AUTO-ENTMCNC: 34.3 G/DL (ref 31.5–35.7)
MCV RBC AUTO: 90.1 FL (ref 79–97)
MONOCYTES # BLD AUTO: 0.95 10*3/MM3 (ref 0.1–0.9)
MONOCYTES NFR BLD AUTO: 9.6 % (ref 5–12)
NEUTROPHILS NFR BLD AUTO: 5.23 10*3/MM3 (ref 1.7–7)
NEUTROPHILS NFR BLD AUTO: 52.9 % (ref 42.7–76)
NRBC BLD AUTO-RTO: 0 /100 WBC (ref 0–0.2)
PLATELET # BLD AUTO: 177 10*3/MM3 (ref 140–450)
PMV BLD AUTO: 10.7 FL (ref 6–12)
POTASSIUM SERPL-SCNC: 4.4 MMOL/L (ref 3.5–5.2)
PROT SERPL-MCNC: 7.2 G/DL (ref 6–8.5)
RBC # BLD AUTO: 4.34 10*6/MM3 (ref 3.77–5.28)
SODIUM SERPL-SCNC: 140 MMOL/L (ref 136–145)
WBC NRBC COR # BLD AUTO: 9.89 10*3/MM3 (ref 3.4–10.8)

## 2025-05-04 PROCEDURE — 86140 C-REACTIVE PROTEIN: CPT | Performed by: EMERGENCY MEDICINE

## 2025-05-04 PROCEDURE — 80053 COMPREHEN METABOLIC PANEL: CPT | Performed by: EMERGENCY MEDICINE

## 2025-05-04 PROCEDURE — 85652 RBC SED RATE AUTOMATED: CPT | Performed by: EMERGENCY MEDICINE

## 2025-05-04 PROCEDURE — 73610 X-RAY EXAM OF ANKLE: CPT

## 2025-05-04 PROCEDURE — 85025 COMPLETE CBC W/AUTO DIFF WBC: CPT | Performed by: EMERGENCY MEDICINE

## 2025-05-04 PROCEDURE — 99283 EMERGENCY DEPT VISIT LOW MDM: CPT

## 2025-05-04 RX ORDER — TRAMADOL HYDROCHLORIDE 50 MG/1
50 TABLET ORAL ONCE
Status: COMPLETED | OUTPATIENT
Start: 2025-05-04 | End: 2025-05-04

## 2025-05-04 RX ORDER — TRAMADOL HYDROCHLORIDE 50 MG/1
50 TABLET ORAL EVERY 6 HOURS PRN
Qty: 12 TABLET | Refills: 0 | Status: SHIPPED | OUTPATIENT
Start: 2025-05-04

## 2025-05-04 RX ADMIN — TRAMADOL HYDROCHLORIDE 50 MG: 50 TABLET, COATED ORAL at 20:44

## 2025-05-05 NOTE — ED PROVIDER NOTES
"Time: 8:09 PM EDT  Date of encounter:  5/4/2025  Independent Historian/Clinical History and Information was obtained by:   Patient    History is limited by: N/A    Chief Complaint: Leg injury      History of Present Illness:  Patient is a 55 y.o. year old female who presents to the emergency department for evaluation of left leg injury.  Patient states that approximately 1 week ago she dropped a grandfather clock onto her leg.  Patient originally had some swelling and pain and was seen at urgent care on the second.  Urgent care thought it looked like a hematoma but started her on prophylactic antibiotics due to the warmth.  They took x-rays of her lower leg and did not find any acute fractures.  Patient was started on Keflex and Bactrim.  Patient has been taking Tylenol for the pain without relief.  Patient states it feels like the pain is shooting up the back of her leg.  Patient is on a 2 times a day blood thinner.      Patient Care Team  Primary Care Provider: Raisa Francisco APRN    Past Medical History:     Allergies   Allergen Reactions    Aspirin Other (See Comments)     \"CAN'T TAKE BECAUSE OF CHF\"    Ibuprofen Other (See Comments)     \"CAN'T TAKE BECAUSE OF CHF\"    Penicillins Hives    Tylenol [Acetaminophen] Other (See Comments)     \"CANT TAKE THEM BECAUSE OF CHF\"     Past Medical History:   Diagnosis Date    Allergic rhinitis     Arthritis     Congestive heart failure     Diabetes mellitus, type II     Edema, lower extremity 06/28/2013    Fracture of nasal bones     GERD (gastroesophageal reflux disease)     Hypertension     Morbid obesity 06/28/2013    Obstructive sleep apnea 06/28/2013    Sleep apnea     Venous insufficiency 06/28/2013     Past Surgical History:   Procedure Laterality Date    CHOLECYSTECTOMY      COLONOSCOPY  2007    HAND SURGERY      KNEE SURGERY  2012    OTHER SURGICAL HISTORY  2007    Ablation of aberrant conduction pathway    TUBAL ABDOMINAL LIGATION       Family History "   Problem Relation Age of Onset    Stroke Other     Heart disease Other     Heart failure Father        Home Medications:  Prior to Admission medications    Medication Sig Start Date End Date Taking? Authorizing Provider   amiodarone (PACERONE) 200 MG tablet Take 200 mg by mouth Daily. 1/20/22   Nurse John Law RN   B-D UF III MINI PEN NEEDLES 31G X 5 MM misc USE ONE PEN NEEDLE 4X DAILY. 6/16/21   Flip Richey MD   Cranberry 1000 MG capsule Take  by mouth.    Nurse John Law RN   Cyanocobalamin (Vitamin B 12) 100 MCG lozenge Take  by mouth.    Nurse John Law RN   digoxin (LANOXIN) 250 MCG tablet Take 1 tablet by mouth Daily. 12/28/21   Emergency, Nurse Epic, RN   Eliquis 5 MG tablet tablet Take 5 mg by mouth 2 (Two) Times a Day. 4/21/21   Flip Richey MD   Fluticasone Propionate 93 MCG/ACT Exhaler Suspension into the nostril(s) as directed by provider.    Nurse John Law RN   furosemide (LASIX) 40 MG tablet Take 1 tablet by mouth 2 (Two) Times a Day. 1/20/22   Nurse John Law RN   glucosamine-chondroitin 500-400 MG capsule capsule Take  by mouth 3 (Three) Times a Day With Meals.    Nurse John Law RN   glucose blood (True Metrix Blood Glucose Test) test strip Check 3 times daily. Dispense brand covered by insurance. 2/8/21   Flip Richey MD   HumuLIN R U-500 KwikPen 500 UNIT/ML solution pen-injector CONCENTRATED injection INJECT 60 UNITS SUBCUTANEOUSLY 3 TIMES A DAY BEFORE MEALS 5/11/21   Flip Richey MD   Liraglutide (Victoza) 18 MG/3ML solution pen-injector injection Inject 1.2 mg under the skin into the appropriate area as directed Daily. 10/28/21   Nurse John Law RN   lisinopril (PRINIVIL,ZESTRIL) 2.5 MG tablet Take 1 tablet by mouth Daily. 11/22/21   Nurse John Law RN   metFORMIN (GLUCOPHAGE) 500 MG tablet Take 1,000 mg by mouth 2 (Two) Times a Day With Meals. 5/11/21   Flip Richey MD   metoprolol tartrate  "(LOPRESSOR) 100 MG tablet Take 100 mg by mouth 3 (Three) Times a Day. 4/22/21   Provider, MD Flip   neomycin-polymyxin-hydrocortisone (CORTISPORIN) 3.5-34897-4 otic solution Administer 3 drops into both ears 3 (Three) Times a Day. 2/9/22   Johnathan Shine MD   spironolactone (ALDACTONE) 25 MG tablet Take 1 tablet by mouth 2 (Two) Times a Day. 1/5/22   Emergency, Nurse John, RN        Social History:   Social History     Tobacco Use    Smoking status: Never    Smokeless tobacco: Never    Tobacco comments:     Denies    Vaping Use    Vaping status: Never Used   Substance Use Topics    Alcohol use: Never    Drug use: Never         Review of Systems:  Review of Systems   Musculoskeletal:  Positive for arthralgias and gait problem.   Skin:  Positive for wound.        Physical Exam:  /60 (Patient Position: Sitting)   Pulse 68   Temp 97.9 °F (36.6 °C) (Oral)   Resp 20   Ht 170.2 cm (67\")   Wt 134 kg (295 lb)   SpO2 97%   BMI 46.20 kg/m²     Physical Exam  HENT:      Head: Normocephalic.      Mouth/Throat:      Mouth: Mucous membranes are moist.   Eyes:      Pupils: Pupils are equal, round, and reactive to light.   Pulmonary:      Effort: Pulmonary effort is normal.   Abdominal:      General: There is no distension.   Musculoskeletal:      Cervical back: Neck supple.      Left lower leg: Swelling and tenderness present. Edema present.      Right foot: Normal.      Left foot: Normal range of motion and normal capillary refill. No bony tenderness. Normal pulse.   Skin:     General: Skin is warm and dry.      Findings: Wound present.          Neurological:      General: No focal deficit present.      Mental Status: She is alert and oriented to person, place, and time.   Psychiatric:         Mood and Affect: Mood normal.         Behavior: Behavior normal.                    Medical Decision Making:      Comorbidities that affect care:    Congestive Heart Failure, Diabetes, Hypertension    External " Notes reviewed:    Previous Clinic Note: Patient seen by urgent care on the second and diagnosed with left lower leg injury and cellulitis and started on antibiotics      The following orders were placed and all results were independently analyzed by me:  Orders Placed This Encounter   Procedures    [facility] 08. AirCast Ankle Stirrup (), 13. Crutches (); Left; Left sided injury    XR Ankle 3+ View Left    Comprehensive Metabolic Panel    C-reactive Protein    Sedimentation Rate    CBC Auto Differential    Apply ace wrap    CBC & Differential       Medications Given in the Emergency Department:  Medications   traMADol (ULTRAM) tablet 50 mg (50 mg Oral Given 5/4/25 2044)        ED Course:    ED Course as of 05/04/25 2239   Sun May 04, 2025   2238 CBC & Differential(!)  The patient´s CBC that was reviewed and interpreted by me shows no abnormalities of critical concern. Of note, there is no anemia requiring a blood transfusion and the platelet count is acceptable. [AS]   2238 Comprehensive Metabolic Panel(!)  The patient´s CMP that was reviewed and interpretted by me shows no abnormalities of critical concern. Of note, the patient´s sodium and potassium are acceptable. The patient´s liver enzymes are unremarkable. The patient´s renal function (creatinine) is preserved. The patient has a normal anion gap. [AS]   2238 XR Ankle 3+ View Left  No acute fracture [AS]      ED Course User Index  [AS] Seaver, Alyce B, RA       Labs:    Lab Results (last 24 hours)       Procedure Component Value Units Date/Time    CBC & Differential [[id number]]  (Abnormal) Collected: 05/04/25 2043    Specimen: Blood from Arm, Left Updated: 05/04/25 2050    Narrative:      The following orders were created for panel order CBC & Differential.  Procedure                               Abnormality         Status                     ---------                               -----------         ------                     CBC Auto  Differential[[id number]]        Abnormal            Final result                 Please view results for these tests on the individual orders.    Comprehensive Metabolic Panel [[id number]]  (Abnormal) Collected: 05/04/25 2043    Specimen: Blood from Arm, Left Updated: 05/04/25 2106     Glucose 121 mg/dL      BUN 21 mg/dL      Creatinine 0.97 mg/dL      Sodium 140 mmol/L      Potassium 4.4 mmol/L      Chloride 103 mmol/L      CO2 30.9 mmol/L      Calcium 10.0 mg/dL      Total Protein 7.2 g/dL      Albumin 4.0 g/dL      ALT (SGPT) 13 U/L      AST (SGOT) 17 U/L      Alkaline Phosphatase 64 U/L      Total Bilirubin 0.5 mg/dL      Globulin 3.2 gm/dL      A/G Ratio 1.3 g/dL      BUN/Creatinine Ratio 21.6     Anion Gap 6.1 mmol/L      eGFR 68.3 mL/min/1.73     Narrative:      GFR Categories in Chronic Kidney Disease (CKD)              GFR Category          GFR (mL/min/1.73)    Interpretation  G1                    90 or greater        Normal or high (1)  G2                    60-89                Mild decrease (1)  G3a                   45-59                Mild to moderate decrease  G3b                   30-44                Moderate to severe decrease  G4                    15-29                Severe decrease  G5                    14 or less           Kidney failure    (1)In the absence of evidence of kidney disease, neither GFR category G1 or G2 fulfill the criteria for CKD.    eGFR calculation 2021 CKD-EPI creatinine equation, which does not include race as a factor    C-reactive Protein [[id number]]  (Abnormal) Collected: 05/04/25 2043    Specimen: Blood from Arm, Left Updated: 05/04/25 2106     C-Reactive Protein 0.65 mg/dL     Sedimentation Rate [[id number]]  (Normal) Collected: 05/04/25 2043    Specimen: Blood from Arm, Left Updated: 05/04/25 2115     Sed Rate 9 mm/hr     CBC Auto Differential [[id number]]  (Abnormal) Collected: 05/04/25 2043    Specimen: Blood from Arm, Left Updated: 05/04/25 2050     WBC 9.89  10*3/mm3      RBC 4.34 10*6/mm3      Hemoglobin 13.4 g/dL      Hematocrit 39.1 %      MCV 90.1 fL      MCH 30.9 pg      MCHC 34.3 g/dL      RDW 13.4 %      RDW-SD 44.0 fl      MPV 10.7 fL      Platelets 177 10*3/mm3      Neutrophil % 52.9 %      Lymphocyte % 33.5 %      Monocyte % 9.6 %      Eosinophil % 2.9 %      Basophil % 0.6 %      Immature Grans % 0.5 %      Neutrophils, Absolute 5.23 10*3/mm3      Lymphocytes, Absolute 3.31 10*3/mm3      Monocytes, Absolute 0.95 10*3/mm3      Eosinophils, Absolute 0.29 10*3/mm3      Basophils, Absolute 0.06 10*3/mm3      Immature Grans, Absolute 0.05 10*3/mm3      nRBC 0.0 /100 WBC              Imaging:    XR Ankle 3+ View Left  Result Date: 5/4/2025  XR ANKLE 3+ VW LEFT-  Date of exam: 5/4/2025 8:33 PM.  Comparison: 12/28/2016.  INDICATIONS: 55-year-old female with history of left ankle pain and swelling; history of dropping heavy object upon the left tibia and fibula as well as the left ankle approximately 1 week previously; persistent pain.  FINDINGS: Three (3) nonweightbearing views of the left ankle were obtained. No acute fracture or acute malalignment is identified. Degenerative and enthesopathic changes are present. Soft tissue swelling is suggested, especially medially, and may represent acute-to-subacute contusion(s). No subcutaneous emphysema. No retained radiopaque foreign body. No significant interval change in the 8 mm subchondral lucency involving the lateral left talar dome. It may represent a subchondral cyst. If symptoms or clinical concerns persist, consider imaging follow-up.       No acute fracture or acute malalignment is identified. Please see above comments for further detail.   Portions of this note were completed with a voice recognition program.  5/4/2025 8:50 PM by Johnathan Groves MD on Workstation: [id number]          Differential Diagnosis and Discussion:    Wound Evaluation: Differential diagnosis includes but is not limited to laceration, abrasion,  puncture, burn, ulcer, cellulitis, abscess, vasculitis, malignancy, and rash.    PROCEDURES:    Labs were collected in the emergency department and all labs were reviewed and interpreted by me.  X-ray were performed in the emergency department and all X-ray impressions were independently interpreted by me.    No orders to display       Procedures    MDM     Amount and/or Complexity of Data Reviewed  Clinical lab tests: reviewed  Tests in the radiology section of CPT®: reviewed             Patient Care Considerations:    I considered on her duplex ultrasound however low likelihood that this is DVT versus hematoma due to mechanism of injury.  I also considered opening the hematoma however due to patient's vascular insufficiency and diabetes the risk of infection/adverse outcome outweighs the risk of benefits.  We will try conservative measures at this time.  Patient is following up with orthopedic surgery on Tuesday and will also follow-up with her primary care provider.      Consultants/Shared Management Plan:    None    Social Determinants of Health:    Patient is independent, reliable, and has access to care.       Disposition and Care Coordination:    Discharged: I considered escalation of care by admitting this patient to the hospital, however the patients vital signs have remained within normal limits, the patient is stable on room air, has adequate follow-up, there was no significant acute findings on workup, and the patient verbalized they will be compliant with the treatment plan.    I have explained the patient´s condition, diagnoses and treatment plan based on the information available to me at this time. I have answered questions and addressed any concerns. The patient has a good  understanding of the patient´s diagnosis, condition, and treatment plan as can be expected at this point. The vital signs have been stable. The patient´s condition is stable and appropriate for discharge from the emergency  department.      The patient will pursue further outpatient evaluation with the primary care physician or other designated or consulting physician as outlined in the discharge instructions. They are agreeable to this plan of care and follow-up instructions have been explained in detail. The patient has received these instructions in written format and has expressed an understanding of the discharge instructions. The patient is aware that any significant change in condition or worsening of symptoms should prompt an immediate return to this or the closest emergency department or call to 911.  I have explained discharge medications and the need for follow up with the patient/caretakers. This was also printed in the discharge instructions. Patient was discharged with the following medications and follow up:      Medication List        New Prescriptions      traMADol 50 MG tablet  Commonly known as: ULTRAM  Take 1 tablet by mouth Every 6 (Six) Hours As Needed for Moderate Pain.               Where to Get Your Medications        These medications were sent to [facility] - [place], [place] - [address] - [phone] [facility] [phone]   [address], [place] [address]      Phone: [phone]   traMADol 50 MG tablet      Raisa Francisco, APRN  [address] [id number]  [phone]             Patient also discharged with crutches, Ace wrap, and ankle Aircast    Final diagnoses:   Contusion of multiple sites of left lower extremity, initial encounter   Hematoma of left lower leg   Contusion of left ankle, initial encounter   Injury of left lower extremity, initial encounter        ED Disposition       ED Disposition   Discharge    Condition   Stable    Comment   --               This medical record created using voice recognition software.             Seaver, Alyce B, APRN  05/04/25 3207

## 2025-05-05 NOTE — DISCHARGE INSTRUCTIONS
Follow up with your primary care provider. Return to ER if symptoms worsen or fail to improve.  Keep follow-up appointment with orthopedic surgery.  Take tramadol as needed for moderate pain, take Tylenol as needed for mild pain but do not exceed 4 g in 24 hours.  Keep ankle and leg wrapped in brace and use crutches for at least the next week.  As discussed keep leg elevated above the level of the heart as often as possible.

## 2025-05-05 NOTE — ED TRIAGE NOTES
Patient arrived to ED c/o left leg pain. Patient states she dropped a weight on her left leg. Patient leg is swollen and bruised. Distal pulses palpable. Patient states she thinks her leg might possibly have an infection. Patient has a purple red bruise on top of leg with the start of a hematoma showing.

## 2025-07-24 ENCOUNTER — OFFICE VISIT (OUTPATIENT)
Age: 55
End: 2025-07-24
Payer: MEDICAID

## 2025-07-24 VITALS
RESPIRATION RATE: 18 BRPM | OXYGEN SATURATION: 100 % | SYSTOLIC BLOOD PRESSURE: 140 MMHG | HEART RATE: 71 BPM | DIASTOLIC BLOOD PRESSURE: 86 MMHG

## 2025-07-24 DIAGNOSIS — M79.669 PAIN AND SWELLING OF LOWER LEG, UNSPECIFIED LATERALITY: ICD-10-CM

## 2025-07-24 DIAGNOSIS — I73.9 PVD (PERIPHERAL VASCULAR DISEASE): Primary | ICD-10-CM

## 2025-07-24 DIAGNOSIS — M79.89 PAIN AND SWELLING OF LOWER LEG, UNSPECIFIED LATERALITY: ICD-10-CM

## 2025-07-24 NOTE — PROGRESS NOTES
[facility] Vascular Surgery New Patient Office Note     Date of Encounter: 07/24/2025     MRN Number: [id number]  Name: Giancarlo Contreras  Phone Number: [phone]     Referred By: Irina Menjivar APRN  PCP: Raisa Francisco APRN    Chief Complaint:    Chief Complaint   Patient presents with    Leg Pain     Patient is a 55 year old female that presents to the clinic with a chief complaint of leg pain . Patient has notable varicosities in the right leg and mild hemosiderin staining in the left.        Subjective      History of Present Illness:    Giancarlo Contreras is a 55 y.o. female     History of Present Illness  The patient presents as a referral from RA Dutta, for cold and painful discoloration in the lower extremities.    She has been experiencing leg issues for some time. Her legs began to improve when she started losing weight and walking in the pool, but they have since deteriorated. She experiences constant aching in her legs, which worsens if she does not take glucosamine chondroitin. Without this medication, she is unable to move. She reports no history of blood clots. She experiences severe leg pain at night, which disrupts her sleep. The more she uses her legs during the day, the worse the pain is at night. She also reports constant coldness in her feet, even when the rest of her body feels hot. She has a few varicose veins on the outside of her legs, which become more prominent in the evenings. She occasionally experiences muscle cramps in the arch of her foot, but not frequently. She has some compression socks at home but is unsure of their compression level.  She takes Eliquis twice daily and lasix for congestive heart failure.     She is diabetic but does not monitor her blood sugar levels. Her insurance company has discontinued coverage for her Dexcom device as her blood sugar levels have been stable for some time. She is no longer on insulin but continues to take  "metformin and Mounjaro, which has resulted in significant weight loss of 165 pounds.      FAMILY HISTORY  Her grandmother had severe varicose veins.  Her sister has varicose veins on the outside of her legs.    MEDICATIONS  Current: Metformin, Mounjaro, Eliquis, glucosamine chondroitin        Review of Systems:  ROS  Review of Systems   Constitutional: Negative.   HENT: Negative.    Cardiovascular: Negative.    Respiratory: Negative.    Skin: Negative.    Musculoskeletal: Negative.    Gastrointestinal: Negative.    Neurological: Negative.    Psychiatric/Behavioral: Negative.     I have reviewed the following portions of the patient's history: problem list, current medications, allergies, past surgical history, past medical history, past social history, past family history, and ROS and confirm it's accurate.    Allergies:  Allergies   Allergen Reactions    Aspirin Other (See Comments)     \"CAN'T TAKE BECAUSE OF CHF\"    Ibuprofen Other (See Comments)     \"CAN'T TAKE BECAUSE OF CHF\"    Penicillins Hives    Tylenol [Acetaminophen] Other (See Comments)     \"CANT TAKE THEM BECAUSE OF CHF\"       Medications:      Current Outpatient Medications:     amiodarone (PACERONE) 200 MG tablet, Take 1 tablet by mouth Daily., Disp: , Rfl:     B-D UF III MINI PEN NEEDLES 31G X 5 MM misc, USE ONE PEN NEEDLE 4X DAILY., Disp: , Rfl:     Cranberry 1000 MG capsule, Take  by mouth., Disp: , Rfl:     Cyanocobalamin (Vitamin B 12) 100 MCG lozenge, Take  by mouth., Disp: , Rfl:     digoxin (LANOXIN) 250 MCG tablet, Take 1 tablet by mouth Daily., Disp: , Rfl:     Eliquis 5 MG tablet tablet, Take 1 tablet by mouth 2 (Two) Times a Day., Disp: , Rfl:     Fluticasone Propionate 93 MCG/ACT Exhaler Suspension, into the nostril(s) as directed by provider., Disp: , Rfl:     furosemide (LASIX) 40 MG tablet, Take 1 tablet by mouth 2 (Two) Times a Day., Disp: , Rfl:     glucosamine-chondroitin 500-400 MG capsule capsule, Take  by mouth 3 (Three) Times a " Day With Meals., Disp: , Rfl:     glucose blood (True Metrix Blood Glucose Test) test strip, Check 3 times daily. Dispense brand covered by insurance., Disp: , Rfl:     HumuLIN R U-500 KwikPen 500 UNIT/ML solution pen-injector CONCENTRATED injection, INJECT 60 UNITS SUBCUTANEOUSLY 3 TIMES A DAY BEFORE MEALS, Disp: , Rfl:     Liraglutide (Victoza) 18 MG/3ML solution pen-injector injection, Inject 1.2 mg under the skin into the appropriate area as directed Daily., Disp: , Rfl:     lisinopril (PRINIVIL,ZESTRIL) 2.5 MG tablet, Take 1 tablet by mouth Daily., Disp: , Rfl:     metFORMIN (GLUCOPHAGE) 500 MG tablet, Take 2 tablets by mouth 2 (Two) Times a Day With Meals., Disp: , Rfl:     metoprolol tartrate (LOPRESSOR) 100 MG tablet, Take 1 tablet by mouth 3 (Three) Times a Day., Disp: , Rfl:     neomycin-polymyxin-hydrocortisone (CORTISPORIN) 3.5-04635-1 otic solution, Administer 3 drops into both ears 3 (Three) Times a Day., Disp: 10 mL, Rfl: 0    spironolactone (ALDACTONE) 25 MG tablet, Take 1 tablet by mouth 2 (Two) Times a Day., Disp: , Rfl:     traMADol (ULTRAM) 50 MG tablet, Take 1 tablet by mouth Every 6 (Six) Hours As Needed for Moderate Pain., Disp: 12 tablet, Rfl: 0    History:   Past Medical History:   Diagnosis Date    Allergic rhinitis     Arthritis     Congestive heart failure     Diabetes mellitus, type II     Edema, lower extremity 06/28/2013    Fracture of nasal bones     GERD (gastroesophageal reflux disease)     Hypertension     Morbid obesity 06/28/2013    Obstructive sleep apnea 06/28/2013    Sleep apnea     Venous insufficiency 06/28/2013       Past Surgical History:   Procedure Laterality Date    CHOLECYSTECTOMY      COLONOSCOPY  2007    HAND SURGERY      KNEE SURGERY  2012    OTHER SURGICAL HISTORY  2007    Ablation of aberrant conduction pathway    TUBAL ABDOMINAL LIGATION         Social History     Socioeconomic History    Marital status:    Tobacco Use    Smoking status: Never     Smokeless tobacco: Never    Tobacco comments:     Denies    Vaping Use    Vaping status: Never Used   Substance and Sexual Activity    Alcohol use: Never    Drug use: Never    Sexual activity: Defer        Family History   Problem Relation Age of Onset    Stroke Other     Heart disease Other     Heart failure Father        Objective     Physical Exam:  Vitals:    07/24/25 1015   BP: 140/86   BP Location: Right arm   Patient Position: Sitting   Cuff Size: Large Adult   Pulse: 71   Resp: 18   SpO2: 100%   PainSc: 6    PainLoc: Leg      There is no height or weight on file to calculate BMI.  Class 3 Severe Obesity (BMI >=40). Obesity-related health conditions include the following: diabetes mellitus and peripheral vascular disease. Obesity is improving with lifestyle modifications. BMI is is above average; no BMI management plan is appropriate. We discussed portion control, increasing exercise, and Information on healthy weight added to patient's after visit summary.       Physical Exam   Physical Exam  Constitutional:       Appearance: Normal  HENT:      Head: Normocephalic and atraumatic.   Eyes:      Pupils: Pupils are equal, round, and reactive to light.   Neck:      Comments:   Cardiovascular:      Rate and Rhythm: Normal rate and regular rhythm.  Bilateral lower extremity: hyperpigmentation/discoloration  Mild swelling, varicosities, +2 palpable dorsalis pedis pulses.  Pulmonary:      Effort: Pulmonary effort is normal.     Abdominal:      Palpations: Abdomen is soft.   Musculoskeletal:      Cervical back: Normal range of motion and neck supple.   Skin:     General: Skin is warm and dry.   Neurological:      General: No focal deficit present.      Mental Status: Alert and oriented to person, place, and time.     Imaging/Labs:    No radiology results for the last 30 days.     Results            Assessment / Plan      Assessment / Plan:  Diagnoses and all orders for this visit:    1. PVD (peripheral vascular  disease) (Primary)  -     Venous w Reflux Lower Extremity - Bilateral CAR; Future  -     Doppler Ankle Brachial Index Single Level CAR; Future    2. Pain and swelling of lower leg, unspecified laterality  -     Venous w Reflux Lower Extremity - Bilateral CAR; Future  -     Doppler Ankle Brachial Index Single Level CAR; Future      Assessment & Plan  1. Lower extremity pain and discoloration.  Her symptoms suggest venous insufficiency, with no evidence of blood clots.Pulses are palpable. A venous ultrasound will be conducted to check for reflux and assess vein efficiency. Ankle-brachial index (ANIBAL) tests will be performed to ensure adequate blood flow. She is advised to wear compression stockings during the day to prevent swelling and aching at night and have discussed with her the different levels of compression therapy.    2. Diabetes mellitus.  She is currently managing her diabetes with metformin and Mounjaro, having lost 165 pounds. Her blood sugar levels are reported to be stable, and she is not taking insulin. She should continue her current medication regimen and maintain regular follow-ups with her primary care provider to monitor her diabetes.    3. Congestive heart failure.  She is taking Eliquis and a fluid pill (80 mg daily) for congestive heart failure.  She should continue her current heart medications and follow up with her cardiologist as needed.      Patient Education: Knee-high compression therapy.      Follow Up:   No follow-ups on file.   Or sooner for any further concerns or worsening sign and symptoms. If unable to reach us in the office please dial 911 or go to the nearest emergency department.      Patient or patient representative verbalized consent for the use of Ambient Listening during the visit with  RA Pappas for chart documentation. 7/24/2025  15:14 EDT    RA Pappas  [facility] Vascular Surgery

## 2025-08-21 ENCOUNTER — HOSPITAL ENCOUNTER (OUTPATIENT)
Dept: CARDIOLOGY | Facility: HOSPITAL | Age: 55
Discharge: HOME | End: 2025-08-21
Payer: MEDICAID

## 2025-08-21 ENCOUNTER — OFFICE VISIT (OUTPATIENT)
Age: 55
End: 2025-08-21
Payer: MEDICAID

## 2025-08-21 VITALS
HEART RATE: 63 BPM | DIASTOLIC BLOOD PRESSURE: 57 MMHG | SYSTOLIC BLOOD PRESSURE: 122 MMHG | OXYGEN SATURATION: 98 % | RESPIRATION RATE: 18 BRPM

## 2025-08-21 DIAGNOSIS — M79.89 PAIN AND SWELLING OF LOWER LEG, UNSPECIFIED LATERALITY: ICD-10-CM

## 2025-08-21 DIAGNOSIS — M79.669 PAIN AND SWELLING OF LOWER LEG, UNSPECIFIED LATERALITY: ICD-10-CM

## 2025-08-21 DIAGNOSIS — M79.605 LEG PAIN, BILATERAL: Primary | ICD-10-CM

## 2025-08-21 DIAGNOSIS — I73.9 PVD (PERIPHERAL VASCULAR DISEASE): ICD-10-CM

## 2025-08-21 DIAGNOSIS — M79.604 LEG PAIN, BILATERAL: Primary | ICD-10-CM

## 2025-08-21 LAB
BH CV LOWER ARTERIAL LEFT ABI RATIO: 1.41
BH CV LOWER ARTERIAL LEFT DORSALIS PEDIS SYS MAX: 152
BH CV LOWER ARTERIAL LEFT GREAT TOE SYS MAX: 98
BH CV LOWER ARTERIAL LEFT POST TIBIAL SYS MAX: 172
BH CV LOWER ARTERIAL LEFT TBI RATIO: 0.8
BH CV LOWER ARTERIAL RIGHT ABI RATIO: 1.43
BH CV LOWER ARTERIAL RIGHT DORSALIS PEDIS SYS MAX: 166
BH CV LOWER ARTERIAL RIGHT GREAT TOE SYS MAX: 76
BH CV LOWER ARTERIAL RIGHT POST TIBIAL SYS MAX: 174
BH CV LOWER ARTERIAL RIGHT TBI RATIO: 0.62
UPPER ARTERIAL LEFT ARM BRACHIAL SYS MAX: 108
UPPER ARTERIAL RIGHT ARM BRACHIAL SYS MAX: 122

## 2025-08-21 PROCEDURE — 93970 EXTREMITY STUDY: CPT

## 2025-08-21 PROCEDURE — 93922 UPR/L XTREMITY ART 2 LEVELS: CPT

## 2025-08-21 RX ORDER — POTASSIUM CHLORIDE 1500 MG/1
20 TABLET, EXTENDED RELEASE ORAL DAILY
COMMUNITY
Start: 2025-08-19

## 2025-08-22 LAB
BH CV LOWER VAS LEFT GSV DIST THIGH COMPRESSIBILTY: NORMAL
BH CV LOWER VAS LEFT GSV MID CALF COMPRESSIBILTY: NORMAL
BH CV LOWER VAS LEFT GSV MID THIGH COMPRESSIBILTY: NORMAL
BH CV LOWER VAS RIGHT GSV DIST THIGH COMPRESSIBILTY: NORMAL
BH CV LOWER VAS RIGHT GSV MID CALF COMPRESSIBILTY: NORMAL
BH CV LOWER VAS RIGHT GSV MID THIGH COMPRESSIBILTY: NORMAL
BH CV LOWER VASCULAR LEFT COMMON FEMORAL AUGMENT: NORMAL
BH CV LOWER VASCULAR LEFT COMMON FEMORAL COMPETENT: NORMAL
BH CV LOWER VASCULAR LEFT COMMON FEMORAL COMPRESS: NORMAL
BH CV LOWER VASCULAR LEFT COMMON FEMORAL PHASIC: NORMAL
BH CV LOWER VASCULAR LEFT COMMON FEMORAL SPONT: NORMAL
BH CV LOWER VASCULAR LEFT DISTAL FEMORAL COMPRESS: NORMAL
BH CV LOWER VASCULAR LEFT GASTRONEMIUS COMPRESS: NORMAL
BH CV LOWER VASCULAR LEFT GREATER SAPH AK COMPETENT: NORMAL
BH CV LOWER VASCULAR LEFT GREATER SAPH AK COMPRESS: NORMAL
BH CV LOWER VASCULAR LEFT GREATER SAPH BK COMPETENT: NORMAL
BH CV LOWER VASCULAR LEFT GREATER SAPH BK COMPRESS: NORMAL
BH CV LOWER VASCULAR LEFT GSV DIST THIGH COMPETENT: NORMAL
BH CV LOWER VASCULAR LEFT GSV MID CALF COMPETENT: NORMAL
BH CV LOWER VASCULAR LEFT GSV MID THIGH COMPETENT: NORMAL
BH CV LOWER VASCULAR LEFT LESSER SAPH COMPETENT: NORMAL
BH CV LOWER VASCULAR LEFT LESSER SAPH COMPRESS: NORMAL
BH CV LOWER VASCULAR LEFT MID FEMORAL AUGMENT: NORMAL
BH CV LOWER VASCULAR LEFT MID FEMORAL COMPETENT: NORMAL
BH CV LOWER VASCULAR LEFT MID FEMORAL COMPRESS: NORMAL
BH CV LOWER VASCULAR LEFT MID FEMORAL PHASIC: NORMAL
BH CV LOWER VASCULAR LEFT MID FEMORAL SPONT: NORMAL
BH CV LOWER VASCULAR LEFT PERONEAL COMPRESS: NORMAL
BH CV LOWER VASCULAR LEFT POPLITEAL AUGMENT: NORMAL
BH CV LOWER VASCULAR LEFT POPLITEAL COMPETENT: NORMAL
BH CV LOWER VASCULAR LEFT POPLITEAL COMPRESS: NORMAL
BH CV LOWER VASCULAR LEFT POPLITEAL PHASIC: NORMAL
BH CV LOWER VASCULAR LEFT POPLITEAL SPONT: NORMAL
BH CV LOWER VASCULAR LEFT POSTERIOR TIBIAL COMPRESS: NORMAL
BH CV LOWER VASCULAR LEFT PROXIMAL FEMORAL COMPRESS: NORMAL
BH CV LOWER VASCULAR LEFT SAPHENOFEMORAL JUNCTION AUGMENT: NORMAL
BH CV LOWER VASCULAR LEFT SAPHENOFEMORAL JUNCTION COMPETENT: NORMAL
BH CV LOWER VASCULAR LEFT SAPHENOFEMORAL JUNCTION COMPRESS: NORMAL
BH CV LOWER VASCULAR LEFT SAPHENOFEMORAL JUNCTION PHASIC: NORMAL
BH CV LOWER VASCULAR LEFT SAPHENOFEMORAL JUNCTION SPONT: NORMAL
BH CV LOWER VASCULAR LEFT SAPHENOPOP JX PHASIC: NORMAL
BH CV LOWER VASCULAR RIGHT COMMON FEMORAL AUGMENT: NORMAL
BH CV LOWER VASCULAR RIGHT COMMON FEMORAL COMPETENT: NORMAL
BH CV LOWER VASCULAR RIGHT COMMON FEMORAL COMPRESS: NORMAL
BH CV LOWER VASCULAR RIGHT COMMON FEMORAL PHASIC: NORMAL
BH CV LOWER VASCULAR RIGHT COMMON FEMORAL SPONT: NORMAL
BH CV LOWER VASCULAR RIGHT DISTAL FEMORAL COMPRESS: NORMAL
BH CV LOWER VASCULAR RIGHT GASTRONEMIUS COMPRESS: NORMAL
BH CV LOWER VASCULAR RIGHT GREATER SAPH AK COMPETENT: NORMAL
BH CV LOWER VASCULAR RIGHT GREATER SAPH BK COMPETENT: NORMAL
BH CV LOWER VASCULAR RIGHT GREATER SAPH BK COMPRESS: NORMAL
BH CV LOWER VASCULAR RIGHT GSV DIST THIGH COMPETENT: NORMAL
BH CV LOWER VASCULAR RIGHT GSV MID CALF COMPETENT: NORMAL
BH CV LOWER VASCULAR RIGHT GSV MID THIGH COMPETENT: NORMAL
BH CV LOWER VASCULAR RIGHT LESSER SAPH COMPETENT: NORMAL
BH CV LOWER VASCULAR RIGHT LESSER SAPH COMPRESS: NORMAL
BH CV LOWER VASCULAR RIGHT MID FEMORAL AUGMENT: NORMAL
BH CV LOWER VASCULAR RIGHT MID FEMORAL COMPETENT: NORMAL
BH CV LOWER VASCULAR RIGHT MID FEMORAL COMPRESS: NORMAL
BH CV LOWER VASCULAR RIGHT MID FEMORAL PHASIC: NORMAL
BH CV LOWER VASCULAR RIGHT MID FEMORAL SPONT: NORMAL
BH CV LOWER VASCULAR RIGHT PERONEAL COMPRESS: NORMAL
BH CV LOWER VASCULAR RIGHT POPLITEAL AUGMENT: NORMAL
BH CV LOWER VASCULAR RIGHT POPLITEAL COMPETENT: NORMAL
BH CV LOWER VASCULAR RIGHT POPLITEAL COMPRESS: NORMAL
BH CV LOWER VASCULAR RIGHT POPLITEAL PHASIC: NORMAL
BH CV LOWER VASCULAR RIGHT POPLITEAL SPONT: NORMAL
BH CV LOWER VASCULAR RIGHT POSTERIOR TIBIAL COMPRESS: NORMAL
BH CV LOWER VASCULAR RIGHT PROXIMAL FEMORAL COMPRESS: NORMAL
BH CV LOWER VASCULAR RIGHT SAPHENOFEMORAL JUNCTION AUGMENT: NORMAL
BH CV LOWER VASCULAR RIGHT SAPHENOFEMORAL JUNCTION COMPETENT: NORMAL
BH CV LOWER VASCULAR RIGHT SAPHENOFEMORAL JUNCTION COMPRESS: NORMAL
BH CV LOWER VASCULAR RIGHT SAPHENOFEMORAL JUNCTION PHASIC: NORMAL
BH CV LOWER VASCULAR RIGHT SAPHENOFEMORAL JUNCTION SPONT: NORMAL
BH CV LOWER VASCULAR RIGHT SAPHENOPOP JX AUGMENT: NORMAL
BH CV LOWER VASCULAR RIGHT SAPHENOPOP JX COMPETENT: NORMAL
BH CV LOWER VASCULAR RIGHT SAPHENOPOP JX COMPRESS: NORMAL
BH CV LOWER VASCULAR RIGHT SAPHENOPOP JX PHASIC: NORMAL
BH CV LOWER VASCULAR RIGHT SAPHENOPOP JX SPONT: NORMAL
BH CV VAS RIGHT GSV PROXIMAL HIDDEN LRR COMPRESSIBILTY: NORMAL